# Patient Record
Sex: MALE | Employment: FULL TIME | URBAN - METROPOLITAN AREA
[De-identification: names, ages, dates, MRNs, and addresses within clinical notes are randomized per-mention and may not be internally consistent; named-entity substitution may affect disease eponyms.]

---

## 2024-08-19 ENCOUNTER — OFFICE VISIT (OUTPATIENT)
Dept: PAIN MEDICINE | Facility: CLINIC | Age: 44
End: 2024-08-19
Payer: COMMERCIAL

## 2024-08-19 VITALS
HEART RATE: 82 BPM | BODY MASS INDEX: 18.94 KG/M2 | SYSTOLIC BLOOD PRESSURE: 128 MMHG | DIASTOLIC BLOOD PRESSURE: 86 MMHG | WEIGHT: 125 LBS | HEIGHT: 68 IN

## 2024-08-19 DIAGNOSIS — M51.16 LUMBAR DISC DISEASE WITH RADICULOPATHY: Primary | ICD-10-CM

## 2024-08-19 PROCEDURE — 99204 OFFICE O/P NEW MOD 45 MIN: CPT | Performed by: STUDENT IN AN ORGANIZED HEALTH CARE EDUCATION/TRAINING PROGRAM

## 2024-08-19 RX ORDER — NAPROXEN 500 MG/1
500 TABLET ORAL
Qty: 42 TABLET | Refills: 0 | Status: SHIPPED | OUTPATIENT
Start: 2024-08-19 | End: 2024-09-02

## 2024-08-19 RX ORDER — CYCLOBENZAPRINE HCL 5 MG
TABLET ORAL
COMMUNITY
Start: 2024-07-31

## 2024-08-19 RX ORDER — NAPROXEN 500 MG/1
500 TABLET ORAL 3 TIMES DAILY
COMMUNITY
Start: 2024-07-31 | End: 2024-08-19 | Stop reason: SDUPTHER

## 2024-08-19 RX ORDER — METHYLPREDNISOLONE 4 MG
TABLET, DOSE PACK ORAL
Qty: 1 EACH | Refills: 0 | Status: SHIPPED | OUTPATIENT
Start: 2024-08-19

## 2024-08-19 RX ORDER — AZITHROMYCIN 250 MG/1
TABLET, FILM COATED ORAL
COMMUNITY
Start: 2024-05-20

## 2024-08-19 NOTE — PROGRESS NOTES
Assessment:  1. Lumbar disc disease with radiculopathy      Patient is a pleasant 44-year-old male who presents with 1 month of low back pain with bilateral radicular symptoms along with axial neck pain.  Over the past but the intensity pains been severe and he rates pain currently as an 8 out of 10 on numeric rating scale.  The pain does interfere with his daily activities and the pain occurs nearly constantly.  During the past month the pain has been worse throughout the day and describes pain as cramping, shooting, numbing, sharp, pins-and-needles, pressure-like.  Patient had some weakness in the lower extremities and does not use any assistive devices.  Activities that increases pain include standing, bending, sitting, walking, exercise, coughing, sneezing, bowel movement.  Patient is have x-ray of his lumbar spine from 7/31/2024 with mild degenerative disc disease at L4-L5.  Patient has a history of ulcers but did get a short prescription of naproxen along with Flexeril.  Prior pain treatments include physical therapy tried at moderate relief and heat which provides moderate relief.  Patient does smoke tobacco, drink alcohol.  Current medications include a CBD ointment which does help somewhat he also uses Motrin.  She has not trialed any nerve pain medications.    On further discussion with patient he does report some incontinence and issues with bowel movements since his acute injury.  On exam patient does have some weakness in the left lower extremity compared to the right lower extremity with diffuse tenderness to palpation in the thoracic and lumbar spine along with positive lumbar facet loading bilaterally.  Given patient's acute weakness along with bowel and bladder issues in the setting of his acute back injury think is reasonable to order an MRI of the lumbar spine to further evaluate for any symptoms.  Additionally will place a referral for physical therapy for evaluation and treatment of his low back  pain with radicular symptoms.  Lastly for symptomatic relief we will continue naproxen 500 mg 3 times daily as needed and also start a Medrol Dosepak.  Patient amenable to this plan.  Plan:  Mri Lumbar spine without contrast given bowel and bladder changes in the setting of acute low back pain along with weakness on exam.   Continue naproxen 500 mg TID prn  Continue robaxin 500 mg TID prn  Medrol dose pack   Ambulatory referral to PT   Orders Placed This Encounter   Procedures   • MRI lumbar spine wo contrast     Standing Status:   Future     Standing Expiration Date:   8/19/2028     Scheduling Instructions:      There is no preparation for this test. Please leave your jewelry and valuables at home, wedding rings are the exception. All patients will be required to change into a hospital gown and pants.  Street clothes are not permitted in the MRI.  Magnetic nail polish must be removed prior to arrival for your test. Please bring your insurance cards, a form of photo ID and a list of your medications with you. Arrive 15 minutes prior to your appointment time in order to register. Please bring any prior CT or MRI studies of this area that were not performed at a [facility] facility.            To schedule this appointment, please contact Central Scheduling at [phone].            Prior to your appointment, please make sure you complete the MRI Screening Form when you e-Check in for your appointment. This will be available starting 7 days before your appointment in "[employer]. You may receive an e-mail with an activation code if you do not have a "[employer] account. If you do not have access to a device, we will complete your screening at your appointment.     Order Specific Question:   Reason for Exam     Answer:   Lumbar radiculopathy with weakness and incontinence     Order Specific Question:   Is the patient pregnant?     Answer:   No     Order Specific Question:   What is the patient's sedation requirement?      Answer:   No Sedation     Order Specific Question:   Does the patient need medication for Claustrophobia? If yes, order medication at this point.     Answer:   No     Order Specific Question:   Does the patient wear a life vest, have an implanted cardiac device, a stimulation device, a sleep apnea stimulator, or a breast tissue expansion device?     Answer:   No     Order Specific Question:   Release to patient through Mychart     Answer:   Immediate   • Ambulatory referral to Physical Therapy     Standing Status:   Future     Standing Expiration Date:   8/19/2025     Referral Priority:   Routine     Referral Type:   Physical Therapy     Referral Reason:   Specialty Services Required     Requested Specialty:   Physical Therapy     Number of Visits Requested:   1     Expiration Date:   8/19/2025       New Medications Ordered This Visit   Medications   • azithromycin (ZITHROMAX) 250 mg tablet     Sig: TAKE 2 TABLETS BY MOUTH TODAY, THEN TAKE 1 TABLET DAILY FOR 4 DAYS AS DIRECTED   • cyclobenzaprine (FLEXERIL) 5 mg tablet     Sig: TAKE 1 TABLET (5 MG TOTAL) BY MOUTH 3 TIMES A DAY FOR 5 DAYS   • methylPREDNISolone 4 MG tablet therapy pack     Sig: Use as directed on package     Dispense:  1 each     Refill:  0   • naproxen (NAPROSYN) 500 mg tablet     Sig: Take 1 tablet (500 mg total) by mouth 3 (three) times a day with meals for 14 days     Dispense:  42 tablet     Refill:  0       My impressions and treatment recommendations were discussed in detail with the patient, who verbalized understanding and had no further questions.      Complete risks and benefits including bleeding, infection, tissue reaction, nerve injury and allergic reaction were discussed. The approach was demonstrated using models and literature was provided. Verbal and written consent was obtained.    Follow-up is planned in four weeks time or sooner as warranted.  Discharge instructions were provided. I personally saw and examined the patient and I  agree with the above discussed plan of care.    History of Present Illness:    Arthur Boyce is a 44 y.o. adult who presents to [facility] for initial evaluation of the above stated pain complaints. The patient has a past medical and chronic pain history as outlined in the assessment section. He was referred by Referral Self  No address on file.    Patient is a pleasant 44-year-old male who presents with 1 month of low back pain with bilateral radicular symptoms along with axial neck pain.  Over the past but the intensity pains been severe and he rates pain currently as an 8 out of 10 on numeric rating scale.  The pain does interfere with his daily activities and the pain occurs nearly constantly.  During the past month the pain has been worse throughout the day and describes pain as cramping, shooting, numbing, sharp, pins-and-needles, pressure-like.  Patient had some weakness in the lower extremities and does not use any assistive devices.  Activities that increases pain include standing, bending, sitting, walking, exercise, coughing, sneezing, bowel movement.  Patient is have x-ray of his lumbar spine from 7/31/2024 with mild degenerative disc disease at L4-L5.  Patient has a history of ulcers but did get a short prescription of naproxen along with Flexeril.  Prior pain treatments include physical therapy tried at moderate relief and heat which provides moderate relief.  Patient does smoke tobacco, drink alcohol.  Current medications include a CBD ointment which does help somewhat he also uses Motrin.  She has not trialed any nerve pain medications.    Review of Systems:    Review of Systems   Constitutional:  Negative for chills and fatigue.   HENT:  Negative for ear pain, mouth sores and sinus pressure.    Eyes:  Negative for pain, redness and visual disturbance.   Respiratory:  Negative for shortness of breath and wheezing.    Cardiovascular:  Negative for chest pain and  "palpitations.   Gastrointestinal:  Positive for constipation. Negative for abdominal pain and nausea.   Endocrine: Negative for polyphagia.   Musculoskeletal:  Positive for back pain, gait problem and joint swelling. Negative for arthralgias and neck pain.        Decreased ROM, joint and muscle pain   Skin:  Negative for wound.   Neurological:  Positive for dizziness, weakness, numbness and headaches. Negative for seizures.   Psychiatric/Behavioral:  Positive for sleep disturbance. Negative for dysphoric mood.            Past Medical History:   Diagnosis Date   • Stomach ulcer        History reviewed. No pertinent surgical history.    Family History   Problem Relation Age of Onset   • No Known Problems Mother    • No Known Problems Father    • No Known Problems Sister        Social History     Occupational History   • Not on file   Tobacco Use   • Smoking status: Every Day     Current packs/day: 10.00     Types: Cigarettes   • Smokeless tobacco: Never   Vaping Use   • Vaping status: Never Used   Substance and Sexual Activity   • Alcohol use: Never   • Drug use: Never   • Sexual activity: Not Currently         Current Outpatient Medications:   •  azithromycin (ZITHROMAX) 250 mg tablet, TAKE 2 TABLETS BY MOUTH TODAY, THEN TAKE 1 TABLET DAILY FOR 4 DAYS AS DIRECTED, Disp: , Rfl:   •  cyclobenzaprine (FLEXERIL) 5 mg tablet, TAKE 1 TABLET (5 MG TOTAL) BY MOUTH 3 TIMES A DAY FOR 5 DAYS, Disp: , Rfl:   •  methylPREDNISolone 4 MG tablet therapy pack, Use as directed on package, Disp: 1 each, Rfl: 0  •  naproxen (NAPROSYN) 500 mg tablet, Take 1 tablet (500 mg total) by mouth 3 (three) times a day with meals for 14 days, Disp: 42 tablet, Rfl: 0    Allergies   Allergen Reactions   • Penicillins Rash     Reaction Date: 06Dec2004; Annotation - [id number]:        Physical Exam:    /86   Pulse 82   Ht 5' 8\" (1.727 m)   Wt 56.7 kg (125 lb)   BMI 19.01 kg/m²     Constitutional: normal, well developed, well nourished, " alert, in no distress and non-toxic and no overt pain behavior.  Eyes: anicteric  HEENT: grossly intact  Neck: supple, symmetric, trachea midline and no masses   Pulmonary:even and unlabored  Cardiovascular:No edema or pitting edema present  Skin:Normal without rashes or lesions and well hydrated  Psychiatric:Mood and affect appropriate  Neurologic:Cranial Nerves II-XII grossly intact  Musculoskeleta     [facility]  Outside Information  Results  XR spine lumbar 2 or 3 views injury (Order [id number])     XR spine lumbar 2 or 3 views injury  Order: [id number]  Impression    At L4/L5 there is minimal retrolisthesis and mild degenerative disc disease    Thank you for your kind referral,  Gonzales Deutsch MD  Narrative    X-RAYS LUMBAR SPINE, 3 VIEWS:    HISTORY: radiculopathy;      PRIORS:  5/5/2015    FINDINGS:    BONES:  No fracture or destructive lesion.  Vertebral bodies are unremarkable.  No facet hypertrophy identified.  DISKS:  Mild degenerative disease at L4/L5.  ALIGNMENT: Minimal retrolisthesis at L4/L5.    SOFT TISSUE: Normal.    Exam End: 07/31/24 10:38 AM Last Resulted: 07/31/24 11:09 AM   Received From: [facility]  Result Received: 08/19/24  7:38 AM    View Encounter        Received Information      l:antalgic gait. TTP in midline lumbar spine and left lumbar paraspinal muscles.     Low Back Exam:   Visual Exam: No rashes  Physical Exam: Patient is tender to palpation in area of the bilateral lumbar paraspinal area.    Tests:    Facet Loading - Patient has positive  left facet loading.  Straight Leg Raise - Patient has positive  bilateral straight leg raise.  Yunier's Test/ERINN - Patient has negative   bilateral ERINN's test.      NEUROLOGICAL:   CN 2-10 intact bilaterally   Sensory Exam: no sensory deficits noted  Reflex: Normal  Strength :Abnormal Hip Flexion, Bilaterally, Graded +5/5 on the right on the left 3+/5, Abnormal Hip Extension, Bilaterally, Graded +5/5 on the right on the  left 3+/5, Normal Adduction of Hip, Bilaterally, Graded +5/5, Normal Abduction of hip, Bilaterally, Graded +5/5, Normal Knee Flexion, Bilaterally, Graded +5/5, Normal Knee Extension, Bilaterally, Graded +5/5, Normal Ankle Plantar Flexion, Bilaterally, Graded +5/5, Normal Ankle Dorsiflexion, Bilaterally, Graded +5/5, Normal Dorsiflexion of Great Toe, Bilaterally, Graded +5/5      Imaging  X-RAYS LUMBAR SPINE, 3 VIEWS:     HISTORY: radiculopathy;      PRIORS:  5/5/2015     FINDINGS:    BONES:  No fracture or destructive lesion.  Vertebral bodies are unremarkable.  No facet hypertrophy identified.   DISKS:  Mild degenerative disease at L4/L5.   ALIGNMENT: Minimal retrolisthesis at L4/L5.    SOFT TISSUE: Normal.       MRI lumbar spine wo contrast    (Results Pending)       Orders Placed This Encounter   Procedures   • MRI lumbar spine wo contrast   • Ambulatory referral to Physical Therapy

## 2024-09-09 ENCOUNTER — HOSPITAL ENCOUNTER (OUTPATIENT)
Dept: RADIOLOGY | Facility: HOSPITAL | Age: 44
Discharge: HOME | End: 2024-09-09
Attending: STUDENT IN AN ORGANIZED HEALTH CARE EDUCATION/TRAINING PROGRAM
Payer: COMMERCIAL

## 2024-09-09 DIAGNOSIS — M51.16 LUMBAR DISC DISEASE WITH RADICULOPATHY: ICD-10-CM

## 2024-09-09 PROCEDURE — 72148 MRI LUMBAR SPINE W/O DYE: CPT

## 2024-09-11 ENCOUNTER — TELEPHONE (OUTPATIENT)
Dept: PAIN MEDICINE | Facility: CLINIC | Age: 44
End: 2024-09-11

## 2024-09-11 NOTE — TELEPHONE ENCOUNTER
Scheduled patient for LESI  Patient denies RX blood thinners/ NSAIDS  Nothing to eat or drink 1 hour prior to procedure  Needs to arrange transportation  Proper clothing for procedure  No vaccines 2 weeks prior or after procedure  If ill or place on antibiotics, please call to reschedule

## 2024-09-11 NOTE — TELEPHONE ENCOUNTER
S/W pt. Advised pt of same. Pt verbalized understanding.    Pt would like to be scheduled for L4-5 LESI as recommended

## 2024-09-11 NOTE — TELEPHONE ENCOUNTER
----- Message from Bhaskar Brooks MD sent at 9/11/2024  8:36 AM EDT -----  Patient with multilevel facet hypertrophy with diffuse disc bulge at L4-L5 with moderate to severe stenosis. Would recommend L4-L5 LESI if patient amenable.

## 2024-09-11 NOTE — TELEPHONE ENCOUNTER
Caller: Arthur     Doctor: MEHRAN    Reason for call: pt returning the nurses call to schedule his procedure. Xfer pt     Call back#: [phone]

## 2024-10-09 ENCOUNTER — APPOINTMENT (OUTPATIENT)
Dept: RADIOLOGY | Facility: HOSPITAL | Age: 44
End: 2024-10-09
Payer: COMMERCIAL

## 2024-10-09 ENCOUNTER — HOSPITAL ENCOUNTER (OUTPATIENT)
Facility: AMBULARY SURGERY CENTER | Age: 44
Setting detail: OUTPATIENT SURGERY
Discharge: HOME | End: 2024-10-09
Attending: STUDENT IN AN ORGANIZED HEALTH CARE EDUCATION/TRAINING PROGRAM | Admitting: STUDENT IN AN ORGANIZED HEALTH CARE EDUCATION/TRAINING PROGRAM
Payer: COMMERCIAL

## 2024-10-09 VITALS
DIASTOLIC BLOOD PRESSURE: 68 MMHG | HEART RATE: 56 BPM | OXYGEN SATURATION: 99 % | RESPIRATION RATE: 18 BRPM | SYSTOLIC BLOOD PRESSURE: 136 MMHG | TEMPERATURE: 98 F

## 2024-10-09 PROBLEM — M54.16 LUMBAR RADICULOPATHY: Status: ACTIVE | Noted: 2024-10-09

## 2024-10-09 PROCEDURE — 62323 NJX INTERLAMINAR LMBR/SAC: CPT | Performed by: STUDENT IN AN ORGANIZED HEALTH CARE EDUCATION/TRAINING PROGRAM

## 2024-10-09 PROCEDURE — NC001 PR NO CHARGE: Performed by: STUDENT IN AN ORGANIZED HEALTH CARE EDUCATION/TRAINING PROGRAM

## 2024-10-09 RX ORDER — LIDOCAINE HYDROCHLORIDE 10 MG/ML
INJECTION, SOLUTION EPIDURAL; INFILTRATION; INTRACAUDAL; PERINEURAL AS NEEDED
Status: DISCONTINUED | OUTPATIENT
Start: 2024-10-09 | End: 2024-10-09 | Stop reason: HOSPADM

## 2024-10-09 RX ORDER — METHYLPREDNISOLONE ACETATE 80 MG/ML
INJECTION, SUSPENSION INTRA-ARTICULAR; INTRALESIONAL; INTRAMUSCULAR; SOFT TISSUE AS NEEDED
Status: DISCONTINUED | OUTPATIENT
Start: 2024-10-09 | End: 2024-10-09 | Stop reason: HOSPADM

## 2024-10-09 RX ORDER — BUPIVACAINE HYDROCHLORIDE 2.5 MG/ML
INJECTION, SOLUTION EPIDURAL; INFILTRATION; INTRACAUDAL AS NEEDED
Status: DISCONTINUED | OUTPATIENT
Start: 2024-10-09 | End: 2024-10-09 | Stop reason: HOSPADM

## 2024-10-09 NOTE — H&P
History of Present Illness: The patient is a 44 y.o. adult who presents with complaints of lumbar radiculopathy    Past Medical History:   Diagnosis Date    Stomach ulcer        History reviewed. No pertinent surgical history.    No current facility-administered medications for this encounter.    Allergies   Allergen Reactions    Penicillins Rash     Reaction Date: 06Dec2004; Annotation - [id number]: bh       Physical Exam:   Vitals:    10/09/24 0753   BP: 130/67   Pulse: (!) 50   Resp: 18   Temp: 98 °F (36.7 °C)   SpO2: 98%     General: Awake, Alert, Oriented x 3, Mood and affect appropriate  Respiratory: Respirations even and unlabored  Cardiovascular: Peripheral pulses intact; no edema  Musculoskeletal Exam: low back pain.     ASA Score: 3    Patient/Chart Verification  Patient ID Verified: Verbal, Armband  ID Band Applied: Yes  H&P( within 30 days) Verified: Yes  Interval H&P(within 24 hr) Complete (required for Outpatients and Surgery Admit only): Yes  Allergies Reviewed: Yes  Beta Blocker given : N/A  Pregnancy Lab Collected: N/A comment    Assessment: Lumbar radiculopathy    Plan: L4-L5 LESI

## 2024-10-09 NOTE — OP NOTE
Pre-procedure Diagnosis: Lumbar radiculopathy  Post-procedure Diagnosis: Lumbar radiculopathy  Procedure Title(s):  1.  L4-L5 interlaminar epidural steroid injection      2. Intraoperative fluoroscopy  Attending Surgeon:   Bhaskar Brooks MD  Anesthesia:   Local     Indications: The patient is a 44 y.o. year-old adult with a diagnosis of lumbar radiculopathy. The patient's history and physical exam were reviewed.  The risks, benefits and alternatives to the procedure were discussed, and all questions were answered to the patient's satisfaction. The patient agreed to proceed, and written informed consent was obtained.    Procedure in Detail: The patient was brought into the procedure room and placed in the prone position on the fluoroscopy table. The area of the lumbar spine was prepped with chlorhexidine gluconate solution times one and draped in a sterile manner.    The L4-L5 interspace was identified and marked under AP fluoroscopy. The skin and subcutaneous tissues in the area were anesthetized with 1% lidocaine. A 20-gauge Tuohy epidural needle was directed toward the interspace under fluoroscopic guidance until the ligamentum flavum was engaged. From this point, a loss of resistance technique with air was used to identify entrance of the needle into the epidural space. Once an appropriate loss was obtained, negative aspiration was confirmed, and 1 ml Omnipaque 300 contrast solution was injected. An appropriate epidurogram was noted.    Then, after negative aspiration, a solution consisting of 1-mL depo-medrol (80mg/mL) and 1-mL bupivacaine 0.25% and 3-mL preservative-free saline was easily injected. The needle was removed with a 1% lidocaine flush. The patient's back was cleaned and a bandage was placed over the site of needle insertion.    Disposition: The patient tolerated the procedure well, and there were no apparent complications. The patient was taken to the recovery area where written discharge  instructions for the procedure were given.     Estimated Blood Loss: None  Specimens Obtained: N/A

## 2024-10-09 NOTE — DISCHARGE INSTRUCTIONS
Epidural Steroid Injection   WHAT YOU NEED TO KNOW:   An epidural steroid injection (MADY) is a procedure to inject steroid medicine into the epidural space. The epidural space is between your spinal cord and vertebrae. Steroids reduce inflammation and fluid buildup in your spine that may be causing pain. You may be given pain medicine along with the steroids.          ACTIVITY  Do not drive or operate machinery today.  No strenuous activity today - bending, lifting, etc.  You may resume normal activites starting tomorrow - start slowly and as tolerated.  You may shower today, but no tub baths or hot tubs.  You may have numbness for several hours from the local anesthetic. Please use caution and common sense, especially with weight-bearing activities.    CARE OF THE INJECTION SITE  If you have soreness or pain, apply ice to the area today (20 minutes on/20 minutes off).  Starting tomorrow, you may use warm, moist heat or ice if needed.  You may have an increase or change in your discomfort for 36-48 hours after your treatment.  Apply ice and continue with any pain medication you have been prescribed.  Notify the [facility] if you have any of the following: redness, drainage, swelling, headache, stiff neck or fever above 100°F.    SPECIAL INSTRUCTIONS  Our office will contact you in approximately 14 days for a progress report.    MEDICATIONS  Continue to take all routine medications.  Our office may have instructed you to hold some medications.    As no general anesthesia was used in today's procedure, you should not experience any side effects related to anesthesia.     If you are diabetic, the steroids used in today's injection may temporarily increase your blood sugar levels after the first few days after your injection. Please keep a close eye on your sugars and alert the doctor who manages your diabetes if your sugars are significantly high from your baseline or you are symptomatic.     If you have a  problem specifically related to your procedure, please call our office at [phone].  Problems not related to your procedure should be directed to your primary care physician.

## 2024-10-11 ENCOUNTER — TELEPHONE (OUTPATIENT)
Age: 44
End: 2024-10-11

## 2024-10-11 DIAGNOSIS — M54.16 LUMBAR RADICULOPATHY: Primary | ICD-10-CM

## 2024-10-11 RX ORDER — CELECOXIB 200 MG/1
200 CAPSULE ORAL 2 TIMES DAILY
Qty: 28 CAPSULE | Refills: 0 | Status: SHIPPED | OUTPATIENT
Start: 2024-10-11 | End: 2024-10-25

## 2024-10-11 NOTE — TELEPHONE ENCOUNTER
Caller: yeny Gibson     Doctor: Todd     Reason for call: pt calling back to speak to a nurse about increased pain he is experiencing     Call back#: [phone]

## 2024-10-11 NOTE — TELEPHONE ENCOUNTER
Caller: Arthur QUIROZ    Doctor: Dr Brooks    Reason for call: Pt is experiencing intense pain in the leg area 10/10 when moving and 5/10 when laying down . Please advise      Call back#: [phone]

## 2024-10-11 NOTE — TELEPHONE ENCOUNTER
S/w pt. S/p 10/9 LESI. Pt states left leg pain has been increased and severe since injection. States difficulty walking due to pain. States pain is primarily in upper thigh and ankle. Describes it as intense burning and muscle cramping. Pt did take flexeril which helped a little but made him too drowsy to take again. Pt is taking motrin without relief.  Pt is aware pain can increase for 48 hours post procedure. Pt aware to give injection 1-2 weeks for full benefit.  Pt is asking if there is anything he can do for severe leg pain today.

## 2024-10-11 NOTE — TELEPHONE ENCOUNTER
mesfin Brooks MD  You7 minutes ago (10:15 AM)      He can split flexeril during the day and take full dose at night if drowsiness is an issue. I will also send a script for celebrex 200 mg BID prn with food. Injection reviewed, everything went well.

## 2024-10-13 ENCOUNTER — NURSE TRIAGE (OUTPATIENT)
Dept: OTHER | Facility: OTHER | Age: 44
End: 2024-10-13

## 2024-10-13 ENCOUNTER — APPOINTMENT (EMERGENCY)
Dept: RADIOLOGY | Facility: HOSPITAL | Age: 44
End: 2024-10-13
Payer: COMMERCIAL

## 2024-10-13 ENCOUNTER — HOSPITAL ENCOUNTER (EMERGENCY)
Facility: HOSPITAL | Age: 44
Discharge: HOME | End: 2024-10-13
Attending: EMERGENCY MEDICINE
Payer: COMMERCIAL

## 2024-10-13 VITALS
TEMPERATURE: 97.2 F | OXYGEN SATURATION: 99 % | SYSTOLIC BLOOD PRESSURE: 109 MMHG | WEIGHT: 125 LBS | DIASTOLIC BLOOD PRESSURE: 63 MMHG | RESPIRATION RATE: 19 BRPM | HEART RATE: 74 BPM | BODY MASS INDEX: 19.01 KG/M2

## 2024-10-13 DIAGNOSIS — R25.2 CRAMPS OF LEFT LOWER EXTREMITY: ICD-10-CM

## 2024-10-13 DIAGNOSIS — G89.29 CHRONIC BACK PAIN: Primary | ICD-10-CM

## 2024-10-13 DIAGNOSIS — M54.9 CHRONIC BACK PAIN: Primary | ICD-10-CM

## 2024-10-13 DIAGNOSIS — E83.42 HYPOMAGNESEMIA: ICD-10-CM

## 2024-10-13 DIAGNOSIS — M51.369 DEGENERATIVE DISC DISEASE, LUMBAR: ICD-10-CM

## 2024-10-13 LAB
ALBUMIN SERPL BCG-MCNC: 4.2 G/DL (ref 3.5–5)
ALP SERPL-CCNC: 58 U/L (ref 34–104)
ALT SERPL W P-5'-P-CCNC: 7 U/L (ref 7–52)
ANION GAP SERPL CALCULATED.3IONS-SCNC: 7 MMOL/L (ref 4–13)
AST SERPL W P-5'-P-CCNC: 10 U/L (ref 5–45)
BASOPHILS # BLD AUTO: 0.05 THOUSANDS/ΜL (ref 0–0.1)
BASOPHILS NFR BLD AUTO: 1 % (ref 0–1)
BILIRUB SERPL-MCNC: 0.49 MG/DL (ref 0.2–1)
BILIRUB UR QL STRIP: NEGATIVE
BUN SERPL-MCNC: 9 MG/DL (ref 5–25)
CALCIUM SERPL-MCNC: 9 MG/DL (ref 8.4–10.2)
CHLORIDE SERPL-SCNC: 104 MMOL/L (ref 96–108)
CLARITY UR: CLEAR
CO2 SERPL-SCNC: 26 MMOL/L (ref 21–32)
COLOR UR: NORMAL
CREAT SERPL-MCNC: 1.06 MG/DL (ref 0.6–1.3)
EOSINOPHIL # BLD AUTO: 0.03 THOUSAND/ΜL (ref 0–0.61)
EOSINOPHIL NFR BLD AUTO: 0 % (ref 0–6)
ERYTHROCYTE [DISTWIDTH] IN BLOOD BY AUTOMATED COUNT: 13.2 % (ref 11.6–15.1)
GLUCOSE SERPL-MCNC: 112 MG/DL (ref 65–140)
GLUCOSE UR STRIP-MCNC: NEGATIVE MG/DL
HCT VFR BLD AUTO: 46.1 % (ref 36.5–46.1)
HGB BLD-MCNC: 15.4 G/DL (ref 12–15.4)
HGB UR QL STRIP.AUTO: NEGATIVE
IMM GRANULOCYTES # BLD AUTO: 0.02 THOUSAND/UL (ref 0–0.2)
IMM GRANULOCYTES NFR BLD AUTO: 0 % (ref 0–2)
KETONES UR STRIP-MCNC: NEGATIVE MG/DL
LEUKOCYTE ESTERASE UR QL STRIP: NEGATIVE
LYMPHOCYTES # BLD AUTO: 2.43 THOUSANDS/ΜL (ref 0.6–4.47)
LYMPHOCYTES NFR BLD AUTO: 27 % (ref 14–44)
MAGNESIUM SERPL-MCNC: 1.4 MG/DL (ref 1.9–2.7)
MCH RBC QN AUTO: 28.3 PG (ref 26.8–34.3)
MCHC RBC AUTO-ENTMCNC: 33.4 G/DL (ref 31.4–37.4)
MCV RBC AUTO: 85 FL (ref 82–98)
MONOCYTES # BLD AUTO: 0.58 THOUSAND/ΜL (ref 0.17–1.22)
MONOCYTES NFR BLD AUTO: 7 % (ref 4–12)
NEUTROPHILS # BLD AUTO: 5.87 THOUSANDS/ΜL (ref 1.85–7.62)
NEUTS SEG NFR BLD AUTO: 65 % (ref 43–75)
NITRITE UR QL STRIP: NEGATIVE
NRBC BLD AUTO-RTO: 0 /100 WBCS
PH UR STRIP.AUTO: 6.5 [PH]
PLATELET # BLD AUTO: 144 THOUSANDS/UL (ref 149–390)
PMV BLD AUTO: 9.9 FL (ref 8.9–12.7)
POTASSIUM SERPL-SCNC: 3.7 MMOL/L (ref 3.5–5.3)
PROT SERPL-MCNC: 7.1 G/DL (ref 6.4–8.4)
PROT UR STRIP-MCNC: NEGATIVE MG/DL
RBC # BLD AUTO: 5.45 MILLION/UL (ref 3.88–5.12)
SODIUM SERPL-SCNC: 137 MMOL/L (ref 135–147)
SP GR UR STRIP.AUTO: 1.01 (ref 1–1.03)
UROBILINOGEN UR STRIP-ACNC: <2 MG/DL
WBC # BLD AUTO: 8.98 THOUSAND/UL (ref 4.31–10.16)

## 2024-10-13 PROCEDURE — 81003 URINALYSIS AUTO W/O SCOPE: CPT | Performed by: EMERGENCY MEDICINE

## 2024-10-13 PROCEDURE — 96365 THER/PROPH/DIAG IV INF INIT: CPT

## 2024-10-13 PROCEDURE — 96361 HYDRATE IV INFUSION ADD-ON: CPT

## 2024-10-13 PROCEDURE — 36415 COLL VENOUS BLD VENIPUNCTURE: CPT | Performed by: EMERGENCY MEDICINE

## 2024-10-13 PROCEDURE — 96366 THER/PROPH/DIAG IV INF ADDON: CPT

## 2024-10-13 PROCEDURE — 96367 TX/PROPH/DG ADDL SEQ IV INF: CPT

## 2024-10-13 PROCEDURE — 74177 CT ABD & PELVIS W/CONTRAST: CPT

## 2024-10-13 PROCEDURE — 99285 EMERGENCY DEPT VISIT HI MDM: CPT

## 2024-10-13 PROCEDURE — 83735 ASSAY OF MAGNESIUM: CPT | Performed by: EMERGENCY MEDICINE

## 2024-10-13 PROCEDURE — 99284 EMERGENCY DEPT VISIT MOD MDM: CPT | Performed by: EMERGENCY MEDICINE

## 2024-10-13 PROCEDURE — 80053 COMPREHEN METABOLIC PANEL: CPT | Performed by: EMERGENCY MEDICINE

## 2024-10-13 PROCEDURE — 96375 TX/PRO/DX INJ NEW DRUG ADDON: CPT

## 2024-10-13 PROCEDURE — 85025 COMPLETE CBC W/AUTO DIFF WBC: CPT | Performed by: EMERGENCY MEDICINE

## 2024-10-13 RX ORDER — ACETAMINOPHEN 10 MG/ML
1000 INJECTION, SOLUTION INTRAVENOUS ONCE
Status: COMPLETED | OUTPATIENT
Start: 2024-10-13 | End: 2024-10-13

## 2024-10-13 RX ORDER — MAGNESIUM SULFATE HEPTAHYDRATE 40 MG/ML
2 INJECTION, SOLUTION INTRAVENOUS ONCE
Status: COMPLETED | OUTPATIENT
Start: 2024-10-13 | End: 2024-10-13

## 2024-10-13 RX ORDER — DEXAMETHASONE SODIUM PHOSPHATE 10 MG/ML
10 INJECTION, SOLUTION INTRAMUSCULAR; INTRAVENOUS ONCE
Status: COMPLETED | OUTPATIENT
Start: 2024-10-13 | End: 2024-10-13

## 2024-10-13 RX ORDER — GABAPENTIN 100 MG/1
100 CAPSULE ORAL 2 TIMES DAILY
Qty: 60 CAPSULE | Refills: 0 | Status: SHIPPED | OUTPATIENT
Start: 2024-10-13

## 2024-10-13 RX ORDER — GABAPENTIN 100 MG/1
100 CAPSULE ORAL ONCE
Status: COMPLETED | OUTPATIENT
Start: 2024-10-13 | End: 2024-10-13

## 2024-10-13 RX ADMIN — MAGNESIUM SULFATE HEPTAHYDRATE 2 G: 40 INJECTION, SOLUTION INTRAVENOUS at 15:14

## 2024-10-13 RX ADMIN — DEXAMETHASONE SODIUM PHOSPHATE 10 MG: 10 INJECTION, SOLUTION INTRAMUSCULAR; INTRAVENOUS at 14:53

## 2024-10-13 RX ADMIN — ACETAMINOPHEN 1000 MG: 10 INJECTION INTRAVENOUS at 14:56

## 2024-10-13 RX ADMIN — IOHEXOL 80 ML: 350 INJECTION, SOLUTION INTRAVENOUS at 15:57

## 2024-10-13 RX ADMIN — SODIUM CHLORIDE 1000 ML: 0.9 INJECTION, SOLUTION INTRAVENOUS at 14:50

## 2024-10-13 RX ADMIN — GABAPENTIN 100 MG: 100 CAPSULE ORAL at 17:20

## 2024-10-13 NOTE — TELEPHONE ENCOUNTER
Per on-call provider, patient needs to be seen in the Emergency Department to rule out complication from injection at this time.

## 2024-10-13 NOTE — ED PROVIDER NOTES
Time reflects when diagnosis was documented in both MDM as applicable and the Disposition within this note       Time User Action Codes Description Comment    10/13/2024  5:15 PM Brandon Orlando Add [M54.9,  G89.29] Chronic back pain     10/13/2024  5:15 PM Brandon Orlando Add [R25.2] Cramps of left lower extremity     10/13/2024  5:15 PM Brandon Orlando Add [E83.42] Hypomagnesemia     10/13/2024  5:17 PM Brandon Orlando Add [M51.369] Degenerative disc disease, lumbar           ED Disposition       ED Disposition   Discharge    Condition   Stable    Date/Time   Sun Oct 13, 2024  5:12 PM    Comment   Arthur Boyce discharge to home/self care.                   Assessment & Plan       Medical Decision Making  Obtain blood work, CT abdomen/pelvis, UA  Give IV fluids, pain medication and continue to monitor patient for any worsening symptoms    Patient's lab work was essentially unremarkable except for low magnesium level.  This may be the cause of patient's muscle cramping.  Magnesium repletion given via IV.  Patient is starting to feel better.  CT scan showed degenerative disease of lumbar spine essentially unchanged from prior MRI done last month.  At this time patient is already followed by pain management physician for his chronic back pain.  Patient has muscle relaxants as well as NSAIDs at home.  Gabapentin added to his pain regimen and patient discharged home with follow-up to PCP as well as pain management.  Close return instructions given to return to the ER for any worsening symptoms.  Patient agrees with discharge plan.  Patient well appearing at time of discharge.    Please Note: Fluency Direct voice recognition software may have been used in the creation of this document. Wrong words or sound a like substitutions may have occurred due to the inherent limitations of the voice software.         Amount and/or Complexity of Data Reviewed  Labs: ordered. Decision-making details documented in ED  Course.  Radiology: ordered. Decision-making details documented in ED Course.    Risk  Prescription drug management.             Medications   gabapentin (NEURONTIN) capsule 100 mg (has no administration in time range)   sodium chloride 0.9 % bolus 1,000 mL (1,000 mL Intravenous New Bag 10/13/24 1450)   magnesium sulfate 2 g/50 mL IVPB (premix) 2 g (0 g Intravenous Stopped 10/13/24 1649)   dexamethasone (PF) (DECADRON) injection 10 mg (10 mg Intravenous Given 10/13/24 1453)   acetaminophen (Ofirmev) injection 1,000 mg (0 mg Intravenous Stopped 10/13/24 1514)   iohexol (OMNIPAQUE) 350 MG/ML injection (MULTI-DOSE) 80 mL (80 mL Intravenous Given 10/13/24 1557)       ED Risk Strat Scores                           SBIRT 22yo+      Flowsheet Row Most Recent Value   Initial Alcohol Screen: US AUDIT-C     1. How often do you have a drink containing alcohol? 0 Filed at: 10/13/2024 1423   2. How many drinks containing alcohol do you have on a typical day you are drinking?  0 Filed at: 10/13/2024 1423   3a. Male UNDER 65: How often do you have five or more drinks on one occasion? 0 Filed at: 10/13/2024 1423   3b. FEMALE Any Age, or MALE 65+: How often do you have 4 or more drinks on one occassion? 0 Filed at: 10/13/2024 1423   Audit-C Score 0 Filed at: 10/13/2024 1423                            History of Present Illness       Chief Complaint   Patient presents with    Extremity Weakness     Had a spinal epidural on Wednesday. Having weakness, pain and spasms in his left leg when he tries to walk.        Past Medical History:   Diagnosis Date    Stomach ulcer       Past Surgical History:   Procedure Laterality Date    EPIDURAL BLOCK INJECTION N/A 10/9/2024    Procedure: L4-L5 LUMBAR EPIDURAL STEROID INJECTION;  Surgeon: Bahskar Brooks MD;  Location: [facility] MAIN OR;  Service: Pain Management       Family History   Problem Relation Age of Onset    No Known Problems Mother     No Known Problems Father     No Known Problems  Sister       Social History     Tobacco Use    Smoking status: Every Day     Current packs/day: 10.00     Types: Cigarettes    Smokeless tobacco: Never   Vaping Use    Vaping status: Never Used   Substance Use Topics    Alcohol use: Never    Drug use: Never      E-Cigarette/Vaping    E-Cigarette Use Never User       E-Cigarette/Vaping Substances    Nicotine No     THC No     CBD No     Flavoring No     Other No     Unknown No       I have reviewed and agree with the history as documented.     44-year-old male with past history of peptic ulcer disease, chronic low back pain secondary to bulging disc and lumbar spine, presents to the ED for evaluation of sharp muscle cramping and pain to the left lower back radiating to left thigh and knee region over the past few days.  Patient is followed by pain management doctor.  Patient had spinal epidural injection done by Dr. Brooks on 10/9/2024.  Since that time patient has had increasing cramping in the lower leg.  Patient denies any numbness or weakness to the left lower extremity.  Patient called Dr. Brooks's office this morning and was told to come to the ED for further evaluation.  Patient denies any saddle anesthesia.  Patient denies any bowel/bladder retention or incontinence.  Patient is able to bear weight in the left lower extremity however feels a lot of cramping sensation and shooting pain when walking.  Patient is taking over-the-counter medications as well as prescription of Celebrex for pain that is not helping.      History provided by:  Patient  Extremity Weakness  Associated symptoms: no abdominal pain, no chest pain, no cough, no ear pain, no fever, no rash, no shortness of breath, no sore throat and no vomiting        Review of Systems   Constitutional:  Negative for chills and fever.   HENT:  Negative for ear pain and sore throat.    Eyes:  Negative for pain and visual disturbance.   Respiratory:  Negative for cough and shortness of breath.     Cardiovascular:  Negative for chest pain and palpitations.   Gastrointestinal:  Negative for abdominal pain and vomiting.   Genitourinary:  Negative for dysuria and hematuria.   Musculoskeletal:  Positive for arthralgias, back pain and extremity weakness.   Skin:  Negative for color change and rash.   Neurological:  Negative for seizures and syncope.   All other systems reviewed and are negative.          Objective       ED Triage Vitals   Temperature Pulse Blood Pressure Respirations SpO2 Patient Position - Orthostatic VS   10/13/24 1422 10/13/24 1422 10/13/24 1422 10/13/24 1422 10/13/24 1422 10/13/24 1649   (!) 97.2 °F (36.2 °C) 93 119/71 16 97 % Lying      Temp src Heart Rate Source BP Location FiO2 (%) Pain Score    -- 10/13/24 1649 10/13/24 1649 -- 10/13/24 1422     Monitor Right arm  8      Vitals      Date and Time Temp Pulse SpO2 Resp BP Pain Score FACES Pain Rating User   10/13/24 1649 -- 70 98 % 20 113/70 -- -- KR   10/13/24 1629 -- -- -- -- -- 6 -- AM   10/13/24 1615 -- 70 99 % -- 103/64 -- -- AM   10/13/24 1530 -- 68 97 % 17 109/62 -- -- AM   10/13/24 1500 -- 72 98 % 18 135/82 7 -- AM   10/13/24 1422 97.2 °F (36.2 °C) 93 97 % 16 119/71 8 -- CAITY            Physical Exam  Vitals and nursing note reviewed.   Constitutional:       General: He is not in acute distress.     Appearance: He is well-developed.   HENT:      Head: Normocephalic and atraumatic.   Eyes:      Conjunctiva/sclera: Conjunctivae normal.   Cardiovascular:      Rate and Rhythm: Normal rate and regular rhythm.      Heart sounds: No murmur heard.  Pulmonary:      Effort: Pulmonary effort is normal. No respiratory distress.      Breath sounds: Normal breath sounds.   Abdominal:      Palpations: Abdomen is soft.      Tenderness: There is no abdominal tenderness.   Musculoskeletal:         General: No swelling.      Cervical back: Neck supple.      Comments: Pulses intact to bilateral lower extremities.  Mild mid spinous tenderness to palpation  noted to the lower lumbar spine.  Tenderness to palpation also noted to left lower back over the PSIS joint.  Positive straight leg raise test on the left side.     Skin:     General: Skin is warm and dry.      Capillary Refill: Capillary refill takes less than 2 seconds.   Neurological:      Mental Status: He is alert.   Psychiatric:         Mood and Affect: Mood normal.         Results Reviewed       Procedure Component Value Units Date/Time    UA w Reflex to Microscopic w Reflex to Culture [[id number]] Collected: 10/13/24 1539    Lab Status: Final result Specimen: Urine, Clean Catch Updated: 10/13/24 1550     Color, UA Light Yellow     Clarity, UA Clear     Specific Gravity, UA 1.010     pH, UA 6.5     Leukocytes, UA Negative     Nitrite, UA Negative     Protein, UA Negative mg/dl      Glucose, UA Negative mg/dl      Ketones, UA Negative mg/dl      Urobilinogen, UA <2.0 mg/dl      Bilirubin, UA Negative     Occult Blood, UA Negative    Comprehensive metabolic panel [[id number]] Collected: 10/13/24 1447    Lab Status: Final result Specimen: Blood from Arm, Left Updated: 10/13/24 1539     Sodium 137 mmol/L      Potassium 3.7 mmol/L      Chloride 104 mmol/L      CO2 26 mmol/L      ANION GAP 7 mmol/L      BUN 9 mg/dL      Creatinine 1.06 mg/dL      Glucose 112 mg/dL      Calcium 9.0 mg/dL      AST 10 U/L      ALT 7 U/L      Alkaline Phosphatase 58 U/L      Total Protein 7.1 g/dL      Albumin 4.2 g/dL      Total Bilirubin 0.49 mg/dL      eGFR --    Narrative:      Notes:     1. eGFR calculation is only valid for adults 18 years and older.  2. EGFR calculation cannot be performed for patients who are transgender, non-binary, or whose legal sex, sex at birth, and gender identity differ.    Magnesium [[id number]]  (Abnormal) Collected: 10/13/24 1447    Lab Status: Final result Specimen: Blood from Arm, Left Updated: 10/13/24 1539     Magnesium 1.4 mg/dL     CBC and differential [[id number]]  (Abnormal) Collected: 10/13/24  1447    Lab Status: Final result Specimen: Blood from Arm, Left Updated: 10/13/24 1455     WBC 8.98 Thousand/uL      RBC 5.45 Million/uL      Hemoglobin 15.4 g/dL      Hematocrit 46.1 %      MCV 85 fL      MCH 28.3 pg      MCHC 33.4 g/dL      RDW 13.2 %      MPV 9.9 fL      Platelets 144 Thousands/uL      nRBC 0 /100 WBCs      Segmented % 65 %      Immature Grans % 0 %      Lymphocytes % 27 %      Monocytes % 7 %      Eosinophils Relative 0 %      Basophils Relative 1 %      Absolute Neutrophils 5.87 Thousands/µL      Absolute Immature Grans 0.02 Thousand/uL      Absolute Lymphocytes 2.43 Thousands/µL      Absolute Monocytes 0.58 Thousand/µL      Eosinophils Absolute 0.03 Thousand/µL      Basophils Absolute 0.05 Thousands/µL             CT abdomen pelvis with contrast   Final Interpretation by Stefan Bragg MD (10/13 1638)      No acute findings in the abdomen or pelvis.         Workstation performed: [id number]         CT recon only lumbar spine   Final Interpretation by Stefan Bragg MD (10/13 2290)      No fracture or traumatic subluxation.      Degenerative disease at L4-L5, similar to 9/9/2024 MRI.            Workstation performed: [id number]             Procedures    ED Medication and Procedure Management   Prior to Admission Medications   Prescriptions Last Dose Informant Patient Reported? Taking?   azithromycin (ZITHROMAX) 250 mg tablet   Yes No   Sig: TAKE 2 TABLETS BY MOUTH TODAY, THEN TAKE 1 TABLET DAILY FOR 4 DAYS AS DIRECTED   celecoxib (CeleBREX) 200 mg capsule   No No   Sig: Take 1 capsule (200 mg total) by mouth 2 (two) times a day for 14 days   cyclobenzaprine (FLEXERIL) 5 mg tablet   Yes No   Sig: TAKE 1 TABLET (5 MG TOTAL) BY MOUTH 3 TIMES A DAY FOR 5 DAYS   methylPREDNISolone 4 MG tablet therapy pack   No No   Sig: Use as directed on package      Facility-Administered Medications: None     Patient's Medications   Discharge Prescriptions    GABAPENTIN (NEURONTIN) 100 MG CAPSULE    Take 1  capsule (100 mg total) by mouth 2 (two) times a day       Start Date: 10/13/2024End Date: --       Order Dose: 100 mg       Quantity: 60 capsule    Refills: 0     No discharge procedures on file.  ED SEPSIS DOCUMENTATION   Time reflects when diagnosis was documented in both MDM as applicable and the Disposition within this note       Time User Action Codes Description Comment    10/13/2024  5:15 PM Brandon Orlando [M54.9,  G89.29] Chronic back pain     10/13/2024  5:15 PM Brandon Orlando [R25.2] Cramps of left lower extremity     10/13/2024  5:15 PM Brandon Orlando [E83.42] Hypomagnesemia     10/13/2024  5:17 PM Brandon Orlando [M51.369] Degenerative disc disease, lumbar                  Brandon Orlando DO  10/13/24 2625

## 2024-10-13 NOTE — TELEPHONE ENCOUNTER
"  Reason for Disposition   Weakness of a leg or foot (e.g., unable to bear weight, dragging foot)    Answer Assessment - Initial Assessment Questions  1. ONSET: \"When did the pain begin?\" (e.g., minutes, hours, days)      Patient states his symptoms have been getting worse since procedure on Wednesday and he feels his left leg is going to explode; states that he is hunched over and walking with a cane. Taking Celebrex with no relief  2. LOCATION: \"Where does it hurt?\" (upper, mid or lower back)      Low back into left leg; leg feels like it has one big sharad  3. SEVERITY: \"How bad is the pain?\"  (e.g., Scale 1-10; mild, moderate, or severe)      10/10 with walking 6-7/10 at rest  4. PATTERN: \"Is the pain constant?\" (e.g., yes, no; constant, intermittent)       It is constant now  5. RADIATION: \"Does the pain shoot into your legs or somewhere else?\"      Yes, down left left  6. CAUSE:  \"What do you think is causing the back pain?\"       Unsure  7. BACK OVERUSE:  \"Any recent lifting of heavy objects, strenuous work or exercise?\"      Denies  8. MEDICINES: \"What have you taken so far for the pain?\" (e.g., nothing, acetaminophen, NSAIDS)      Injection, Celebrex    9. NEUROLOGIC SYMPTOMS: \"Do you have any weakness, numbness, or problems with bowel/bladder control?\"      Weak in left in left leg; denies problems with bowel or bladder control  10. OTHER SYMPTOMS: \"Do you have any other symptoms?\" (e.g., fever, abdomen pain, burning with urination, blood in urine)        Denies    Protocols used: Back Pain-Adult-AH    "

## 2024-10-13 NOTE — TELEPHONE ENCOUNTER
"Regarding: Spinal epidural procedure/losing ability to walk  ----- Message from Ginger SALEH sent at 10/13/2024 10:44 AM EDT -----  \"I had a spinal epidural procedure and I've been getting worse they gave me mediation and told me to call back if it gets worse and it has. I am losing the ability to walk\"    "

## 2024-10-14 ENCOUNTER — OFFICE VISIT (OUTPATIENT)
Dept: PAIN MEDICINE | Facility: CLINIC | Age: 44
End: 2024-10-14
Payer: COMMERCIAL

## 2024-10-14 VITALS
BODY MASS INDEX: 20.4 KG/M2 | WEIGHT: 130 LBS | HEIGHT: 67 IN | DIASTOLIC BLOOD PRESSURE: 83 MMHG | SYSTOLIC BLOOD PRESSURE: 154 MMHG | HEART RATE: 80 BPM

## 2024-10-14 DIAGNOSIS — R29.898 LEFT LEG WEAKNESS: ICD-10-CM

## 2024-10-14 DIAGNOSIS — M54.16 LUMBAR RADICULOPATHY: Primary | ICD-10-CM

## 2024-10-14 PROCEDURE — 99214 OFFICE O/P EST MOD 30 MIN: CPT | Performed by: STUDENT IN AN ORGANIZED HEALTH CARE EDUCATION/TRAINING PROGRAM

## 2024-10-14 NOTE — TELEPHONE ENCOUNTER
S/w Pt, advised of the same. Pt was evaluated in ER, told to F/u with PCP and [facility]. Pt scheduled for LOVS with [facility] on 10/14 at 1000. Pt verbalized understanding.

## 2024-10-21 NOTE — TELEPHONE ENCOUNTER
Please provide patient with number to medical records and sign release for new provider. Thank you

## 2024-10-21 NOTE — TELEPHONE ENCOUNTER
Caller: yeny Gibson     Doctor: Todd    Reason for call: pt requesting his MRI results. Pt stated he has a upcoming appt tomorrow with another provider, and needs MRI results. PT stated he was not happy with the care that was provided to him by Dr. Brooks. Please Advise     Call back#: [phone]

## 2024-10-22 ENCOUNTER — TELEPHONE (OUTPATIENT)
Age: 44
End: 2024-10-22

## 2024-10-22 NOTE — TELEPHONE ENCOUNTER
Caller: patient    Doctor: MEHRAN    Reason for call: would like hs records   Will FU with a different PM   Patient stated he signed a medical release form   Please advise with patient     Call back#:

## 2024-10-22 NOTE — TELEPHONE ENCOUNTER
Pt stopped into the office today to sign the Medical Release form. The form was completed by the patient and faxed to [facility]. Gave patient a copy of the fax confirmation and the release form. All have been scanned into the pt's chart. Advised pt it could take up to 30 days to receive the records. Pt was upset that he couldn't just walk into the office and get copies of all his records & test results. Pt wanted something in writing stating he has been unable to work and needs something to send to disability. At this point the pt received a phone call, and answered the call, while at my desk. The patient then finished the phone call and walked away from my desk, leaving the office.

## 2024-10-22 NOTE — TELEPHONE ENCOUNTER
We do not have a signed medical release form on file. I called the pt to give the pt the phone # for medical records and vm is full, unable to leave a vm.

## 2024-10-22 NOTE — TELEPHONE ENCOUNTER
Caller: yeny Gibson    Doctor: Todd    Reason for call: pt called about another issue and while on the phone he stated that he has not been able to return to work since his procedure with Dr. Brooks.  Pt stated he is having difficulty walking since the injection and is starting the process for disability.    Pt stated that his PCP told him they can't take him out of work because they have treated him for colds and sinus infections    Call back#: [phone]

## 2024-10-22 NOTE — TELEPHONE ENCOUNTER
Caller: yeny Gibson    Doctor: Todd    Reason for call: pt returning a missed call.  He was given the below message and will stop by the office to complete the CHARLA form    Call back#: [phone]

## 2024-10-22 NOTE — TELEPHONE ENCOUNTER
S/w pt, stated that since his procedure with Dr. Brooks he has not been able to walk to stand up straight. Pt stated that he left the procedure with [facility] on crutches provided by  because he could not walk. Per pt, he went to the ER that weekend s/t pain and came back in for an ov which did not yield any results. Pt stated that he has been dissatisfied with the treatment he has received from this office and [facility] - as his pain is no better, possibly worse and he has not been able to get his records released.     Per pt, he cancelled his ov with spa today due to his dissatisfaction and poor results and went to see a spine specialist who advised pt that if he was going to get relief from the procedure with [facility] it would have happened by now. Per pt, he wants his records released to Dr. Faye, Phone [phone].     Pt stated that he needs a letter from Dr. Brooks re: missed work since his procedure as well as ongoing missed work. 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 Advised pt, the writer will d/w Dr. Brooks however, this is likely to be determine by a physiologist who will perform a functional capacity evaluation. Per pt, he was told on the way out of his procedure not to go to work that day - to go home and rest. Pt expressed great frustration, stated that he would like a cb after he has had a chance to calm down. Pt ended the call.     Forwarding to Dr. Brooks and ARMANDO Galindo for review.

## 2024-10-23 ENCOUNTER — TELEPHONE (OUTPATIENT)
Dept: PHYSICAL THERAPY | Facility: CLINIC | Age: 44
End: 2024-10-23

## 2024-10-23 NOTE — TELEPHONE ENCOUNTER
Patient sent internal message to cancel his scheduled appts as he is going to see a new doctor out of network. Future appts were removed.

## (undated) DEVICE — SYRINGE EPI 8ML LUER SLIP LOSS OF RESISTANCE PLASTIC PERFIX

## (undated) DEVICE — PLASTIC ADHESIVE BANDAGE: Brand: CURITY

## (undated) DEVICE — CHLORAPREP APPLICATOR TINTED 10.5ML ONE-STEP

## (undated) DEVICE — TOWEL SET X-RAY

## (undated) DEVICE — SYRINGE 5ML LL

## (undated) DEVICE — RADIOLOGY STERILE LABELS: Brand: CENTURION

## (undated) DEVICE — GLOVE SRG LF STRL BGL SKNSNS 7.5 PF

## (undated) DEVICE — IV SET EXT SM BORE CARESITE 8IN

## (undated) DEVICE — TRAY EPIDURAL PERIFIX 20GA X 3.5IN TUOHY 8ML

## (undated) DEVICE — TRAY PAIN SUPPORT